# Patient Record
Sex: FEMALE | Race: WHITE | NOT HISPANIC OR LATINO
[De-identification: names, ages, dates, MRNs, and addresses within clinical notes are randomized per-mention and may not be internally consistent; named-entity substitution may affect disease eponyms.]

---

## 2017-03-09 ENCOUNTER — APPOINTMENT (OUTPATIENT)
Age: 67
End: 2017-03-09

## 2017-03-09 DIAGNOSIS — L97.509 TYPE 2 DIABETES MELLITUS WITH FOOT ULCER: ICD-10-CM

## 2017-03-09 DIAGNOSIS — E11.621 TYPE 2 DIABETES MELLITUS WITH FOOT ULCER: ICD-10-CM

## 2017-03-09 PROBLEM — Z00.00 ENCOUNTER FOR PREVENTIVE HEALTH EXAMINATION: Noted: 2017-03-09

## 2017-03-16 ENCOUNTER — APPOINTMENT (OUTPATIENT)
Age: 67
End: 2017-03-16

## 2017-03-16 RX ORDER — AMOXICILLIN 500 MG/1
500 TABLET, FILM COATED ORAL 3 TIMES DAILY
Refills: 0 | Status: ACTIVE | COMMUNITY
Start: 2017-03-09

## 2017-03-30 ENCOUNTER — APPOINTMENT (OUTPATIENT)
Age: 67
End: 2017-03-30

## 2017-04-20 ENCOUNTER — APPOINTMENT (OUTPATIENT)
Age: 67
End: 2017-04-20

## 2017-05-04 ENCOUNTER — APPOINTMENT (OUTPATIENT)
Age: 67
End: 2017-05-04

## 2017-06-01 ENCOUNTER — OUTPATIENT (OUTPATIENT)
Dept: OUTPATIENT SERVICES | Facility: HOSPITAL | Age: 67
LOS: 1 days | Discharge: HOME | End: 2017-06-01

## 2017-06-01 DIAGNOSIS — M86.9 OSTEOMYELITIS, UNSPECIFIED: ICD-10-CM

## 2017-06-28 DIAGNOSIS — L97.522 NON-PRESSURE CHRONIC ULCER OF OTHER PART OF LEFT FOOT WITH FAT LAYER EXPOSED: ICD-10-CM

## 2017-07-31 ENCOUNTER — OUTPATIENT (OUTPATIENT)
Dept: OUTPATIENT SERVICES | Facility: HOSPITAL | Age: 67
LOS: 1 days | Discharge: HOME | End: 2017-07-31

## 2017-07-31 DIAGNOSIS — M86.9 OSTEOMYELITIS, UNSPECIFIED: ICD-10-CM

## 2017-07-31 DIAGNOSIS — L97.511 NON-PRESSURE CHRONIC ULCER OF OTHER PART OF RIGHT FOOT LIMITED TO BREAKDOWN OF SKIN: ICD-10-CM

## 2017-11-13 ENCOUNTER — EMERGENCY (EMERGENCY)
Facility: HOSPITAL | Age: 67
LOS: 0 days | Discharge: HOME | End: 2017-11-13

## 2017-11-13 DIAGNOSIS — R10.32 LEFT LOWER QUADRANT PAIN: ICD-10-CM

## 2017-11-13 DIAGNOSIS — Z88.1 ALLERGY STATUS TO OTHER ANTIBIOTIC AGENTS STATUS: ICD-10-CM

## 2017-11-13 DIAGNOSIS — R11.0 NAUSEA: ICD-10-CM

## 2017-11-13 DIAGNOSIS — Z79.899 OTHER LONG TERM (CURRENT) DRUG THERAPY: ICD-10-CM

## 2017-11-13 DIAGNOSIS — R10.33 PERIUMBILICAL PAIN: ICD-10-CM

## 2017-11-13 DIAGNOSIS — E11.9 TYPE 2 DIABETES MELLITUS WITHOUT COMPLICATIONS: ICD-10-CM

## 2017-11-13 DIAGNOSIS — M54.9 DORSALGIA, UNSPECIFIED: ICD-10-CM

## 2017-11-13 DIAGNOSIS — R10.12 LEFT UPPER QUADRANT PAIN: ICD-10-CM

## 2017-11-13 DIAGNOSIS — M86.9 OSTEOMYELITIS, UNSPECIFIED: ICD-10-CM

## 2017-11-13 DIAGNOSIS — Z98.890 OTHER SPECIFIED POSTPROCEDURAL STATES: ICD-10-CM

## 2018-04-25 ENCOUNTER — TRANSCRIPTION ENCOUNTER (OUTPATIENT)
Age: 68
End: 2018-04-25

## 2018-05-21 ENCOUNTER — EMERGENCY (EMERGENCY)
Facility: HOSPITAL | Age: 68
LOS: 0 days | Discharge: HOME | End: 2018-05-21
Admitting: PHYSICIAN ASSISTANT

## 2018-05-21 VITALS
TEMPERATURE: 96 F | HEART RATE: 107 BPM | OXYGEN SATURATION: 99 % | RESPIRATION RATE: 17 BRPM | SYSTOLIC BLOOD PRESSURE: 151 MMHG | DIASTOLIC BLOOD PRESSURE: 113 MMHG
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
## 2018-05-21 DIAGNOSIS — Z88.1 ALLERGY STATUS TO OTHER ANTIBIOTIC AGENTS STATUS: ICD-10-CM

## 2018-05-21 DIAGNOSIS — F41.9 ANXIETY DISORDER, UNSPECIFIED: ICD-10-CM
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
## 2018-09-16 ENCOUNTER — INPATIENT (INPATIENT)
Facility: HOSPITAL | Age: 68
LOS: 0 days | Discharge: HOME | End: 2018-09-17
Attending: HOSPITALIST | Admitting: HOSPITALIST

## 2018-09-16 VITALS
DIASTOLIC BLOOD PRESSURE: 77 MMHG | HEIGHT: 67 IN | WEIGHT: 255.07 LBS | RESPIRATION RATE: 95 BRPM | OXYGEN SATURATION: 97 % | TEMPERATURE: 97 F | SYSTOLIC BLOOD PRESSURE: 169 MMHG

## 2018-09-16 DIAGNOSIS — Z98.890 OTHER SPECIFIED POSTPROCEDURAL STATES: Chronic | ICD-10-CM

## 2018-09-16 LAB
ANION GAP SERPL CALC-SCNC: 15 MMOL/L — HIGH (ref 7–14)
APTT BLD: 35.9 SEC — SIGNIFICANT CHANGE UP (ref 27–39.2)
BASOPHILS # BLD AUTO: 0.04 K/UL — SIGNIFICANT CHANGE UP (ref 0–0.2)
BASOPHILS NFR BLD AUTO: 0.3 % — SIGNIFICANT CHANGE UP (ref 0–1)
BUN SERPL-MCNC: 11 MG/DL — SIGNIFICANT CHANGE UP (ref 10–20)
CALCIUM SERPL-MCNC: 9.5 MG/DL — SIGNIFICANT CHANGE UP (ref 8.5–10.1)
CHLORIDE SERPL-SCNC: 100 MMOL/L — SIGNIFICANT CHANGE UP (ref 98–110)
CO2 SERPL-SCNC: 25 MMOL/L — SIGNIFICANT CHANGE UP (ref 17–32)
CREAT SERPL-MCNC: 0.8 MG/DL — SIGNIFICANT CHANGE UP (ref 0.7–1.5)
EOSINOPHIL # BLD AUTO: 0 K/UL — SIGNIFICANT CHANGE UP (ref 0–0.7)
EOSINOPHIL NFR BLD AUTO: 0 % — SIGNIFICANT CHANGE UP (ref 0–8)
ERYTHROCYTE [SEDIMENTATION RATE] IN BLOOD: 41 MM/HR — HIGH (ref 0–20)
GLUCOSE BLDC GLUCOMTR-MCNC: 147 MG/DL — HIGH (ref 70–99)
GLUCOSE BLDC GLUCOMTR-MCNC: 176 MG/DL — HIGH (ref 70–99)
GLUCOSE SERPL-MCNC: 134 MG/DL — HIGH (ref 70–99)
HCT VFR BLD CALC: 39.7 % — SIGNIFICANT CHANGE UP (ref 37–47)
HGB BLD-MCNC: 12.9 G/DL — SIGNIFICANT CHANGE UP (ref 12–16)
IMM GRANULOCYTES NFR BLD AUTO: 0.5 % — HIGH (ref 0.1–0.3)
INR BLD: 1.18 RATIO — SIGNIFICANT CHANGE UP (ref 0.65–1.3)
LYMPHOCYTES # BLD AUTO: 19 % — LOW (ref 20.5–51.1)
LYMPHOCYTES # BLD AUTO: 2.88 K/UL — SIGNIFICANT CHANGE UP (ref 1.2–3.4)
MCHC RBC-ENTMCNC: 27.2 PG — SIGNIFICANT CHANGE UP (ref 27–31)
MCHC RBC-ENTMCNC: 32.5 G/DL — SIGNIFICANT CHANGE UP (ref 32–37)
MCV RBC AUTO: 83.6 FL — SIGNIFICANT CHANGE UP (ref 81–99)
MONOCYTES # BLD AUTO: 1.12 K/UL — HIGH (ref 0.1–0.6)
MONOCYTES NFR BLD AUTO: 7.4 % — SIGNIFICANT CHANGE UP (ref 1.7–9.3)
NEUTROPHILS # BLD AUTO: 11.08 K/UL — HIGH (ref 1.4–6.5)
NEUTROPHILS NFR BLD AUTO: 72.8 % — SIGNIFICANT CHANGE UP (ref 42.2–75.2)
NRBC # BLD: 0 /100 WBCS — SIGNIFICANT CHANGE UP (ref 0–0)
PLATELET # BLD AUTO: 335 K/UL — SIGNIFICANT CHANGE UP (ref 130–400)
POTASSIUM SERPL-MCNC: 4.2 MMOL/L — SIGNIFICANT CHANGE UP (ref 3.5–5)
POTASSIUM SERPL-SCNC: 4.2 MMOL/L — SIGNIFICANT CHANGE UP (ref 3.5–5)
PROTHROM AB SERPL-ACNC: 12.7 SEC — SIGNIFICANT CHANGE UP (ref 9.95–12.87)
RBC # BLD: 4.75 M/UL — SIGNIFICANT CHANGE UP (ref 4.2–5.4)
RBC # FLD: 13.8 % — SIGNIFICANT CHANGE UP (ref 11.5–14.5)
SODIUM SERPL-SCNC: 140 MMOL/L — SIGNIFICANT CHANGE UP (ref 135–146)
WBC # BLD: 15.19 K/UL — HIGH (ref 4.8–10.8)
WBC # FLD AUTO: 15.19 K/UL — HIGH (ref 4.8–10.8)

## 2018-09-16 RX ORDER — ALPRAZOLAM 0.25 MG
1 TABLET ORAL DAILY
Refills: 0 | Status: DISCONTINUED | OUTPATIENT
Start: 2018-09-16 | End: 2018-09-17

## 2018-09-16 RX ORDER — IBUPROFEN 200 MG
200 TABLET ORAL EVERY 6 HOURS
Refills: 0 | Status: DISCONTINUED | OUTPATIENT
Start: 2018-09-16 | End: 2018-09-17

## 2018-09-16 RX ORDER — BUDESONIDE AND FORMOTEROL FUMARATE DIHYDRATE 160; 4.5 UG/1; UG/1
2 AEROSOL RESPIRATORY (INHALATION)
Refills: 0 | Status: DISCONTINUED | OUTPATIENT
Start: 2018-09-16 | End: 2018-09-17

## 2018-09-16 RX ORDER — PANTOPRAZOLE SODIUM 20 MG/1
40 TABLET, DELAYED RELEASE ORAL
Refills: 0 | Status: DISCONTINUED | OUTPATIENT
Start: 2018-09-16 | End: 2018-09-17

## 2018-09-16 RX ORDER — ENOXAPARIN SODIUM 100 MG/ML
40 INJECTION SUBCUTANEOUS EVERY 24 HOURS
Refills: 0 | Status: DISCONTINUED | OUTPATIENT
Start: 2018-09-16 | End: 2018-09-17

## 2018-09-16 RX ADMIN — Medication 200 MILLIGRAM(S): at 20:42

## 2018-09-16 RX ADMIN — Medication 100 MILLIGRAM(S): at 20:52

## 2018-09-16 RX ADMIN — Medication 100 MILLIGRAM(S): at 06:02

## 2018-09-16 RX ADMIN — Medication 100 MILLIGRAM(S): at 14:04

## 2018-09-16 RX ADMIN — Medication 200 MILLIGRAM(S): at 20:12

## 2018-09-16 NOTE — ED PROVIDER NOTE - PROGRESS NOTE DETAILS
Consulted podiatry, pending their evaluation.   Discussed with patient, Hospitalist and MAR, patient is admitted to medicine for IV abx and Podiatry consult.

## 2018-09-16 NOTE — ED ADULT TRIAGE NOTE - CHIEF COMPLAINT QUOTE
A few hours ago I noticed a blood blister on the bottom of my left foot, I'm borderline diabetic, not my whole foot is getting red and there is a line. I've been on Augmentin for two doses because I have a sore throat - patient

## 2018-09-16 NOTE — H&P ADULT - NSHPPHYSICALEXAM_GEN_ALL_CORE
T(C): 35.9 (09-16-18 @ 03:12), Max: 35.9 (09-16-18 @ 03:12)  HR: --  BP: 169/77 (09-16-18 @ 03:12) (169/77 - 169/77)  RR: 95 (09-16-18 @ 03:12) (95 - 95)  SpO2: 97% (09-16-18 @ 03:12) (97% - 97%)    PHYSICAL EXAM:  GENERAL: NAD, speaks in full sentences, no signs of respiratory distress  HEAD:  Atraumatic, Normocephalic  EYES: EOMI, PERRLA,   NECK: Supple,  CHEST/LUNG: Clear to auscultation bilaterally; No wheeze; No crackles; No accessory muscles used  HEART: Regular rate and rhythm; No murmurs;   ABDOMEN: Soft, Nontender, Nondistended; obese, scar in lower abdomen  EXTREMITIES:  2+ Peripheral Pulses, No cyanosis or edema, lt foot  hemorrhagic blister with surrounding erythema and warmth  PSYCH: AAOx3  NEUROLOGY: non-focal
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
## 2018-09-16 NOTE — ED ADULT NURSE NOTE - NSIMPLEMENTINTERV_GEN_ALL_ED
Implemented All Universal Safety Interventions:  [place] to call system. Call bell, personal items and telephone within reach. Instruct patient to call for assistance. Room bathroom lighting operational. Non-slip footwear when patient is off stretcher. Physically safe environment: no spills, clutter or unnecessary equipment. Stretcher in lowest position, wheels locked, appropriate side rails in place.

## 2018-09-16 NOTE — CONSULT NOTE ADULT - ATTENDING COMMENTS
68 year old female known to podiatry,evaluated bedside this AM,with resident. Left foot with superficial ulceration 1.5cmx1.0cm left foot first mpj area. There is no cellulitis S/P superficial I&D left foot. Agree with above ,continue local wound care and would D/C on oral antibiotic for 10 days. Follow up in office on D/C.

## 2018-09-16 NOTE — H&P ADULT - NSHPLABSRESULTS_GEN_ALL_CORE
12.9   15.19 )-----------( 335      ( 16 Sep 2018 04:23 )             39.7       09-16    140  |  100  |  11  ----------------------------<  134<H>  4.2   |  25  |  0.8    Ca    9.5      16 Sep 2018 04:23                    PT/INR - ( 16 Sep 2018 04:23 )   PT: 12.70 sec;   INR: 1.18 ratio         PTT - ( 16 Sep 2018 04:23 )  PTT:35.9 sec    Lactate Trend            CAPILLARY BLOOD GLUCOSE

## 2018-09-16 NOTE — H&P ADULT - PMH
Borderline diabetes    GERD (gastroesophageal reflux disease)    High cholesterol    HTN (hypertension)    Rheumatoid arthritis

## 2018-09-16 NOTE — ED PROVIDER NOTE - MEDICAL DECISION MAKING DETAILS
Patient presented to ED with foot pain/swelling/redness, labs noted, x-ray reviewed, consulted podiatry, IV abx given and is admitted to medicine for IV abx

## 2018-09-16 NOTE — H&P ADULT - ATTENDING COMMENTS
68 y f with pmh of HTN, RA , OA  PW Left Foot Cellulitis  1-Left Foot Cellulitis: IV clindamycin- ID eval-Foot X-Ray- Podiatry eval -drain and wound dressing  2-COPD: Stable, cw symbicort  3-GERD: cw PPI  4- DVT, GI Prophylaxis  Pager No. [phone] Patient evaluated independently, reviewed the chart and previous records, agree with the medical resident clinical findings and the plan of medical care, reviewed the medication reconciliation  68 y f with pmh of HTN, RA , OA  PW Left Foot Cellulitis  1-Left Foot Cellulitis: IV clindamycin- ID eval-Foot X-Ray- Podiatry eval -drain and wound dressing  2-COPD: Stable, cw symbicort  3-GERD: cw PPI  4- DVT, GI Prophylaxis  Pager No. [phone]

## 2018-09-16 NOTE — H&P ADULT - HISTORY OF PRESENT ILLNESS
68 y f with pmh of prediabetes, htn ,RA , OA, gerd who recently  had been to her podiatrist for callus area small blister on Lt foot, which was removed, she was recovering well. Patient was having sore throat, so completed course of prednisone, and still had symptoms, so her doctor gave Augmentin yesterday, she noticed small swelling on lt foot yesterday which got worse by night along with erythema which was streaking up but denies any fever ,chills, n/v, pain in foot, trauma. she also denies any other symptoms

## 2018-09-16 NOTE — ED ADULT NURSE NOTE - OBJECTIVE STATEMENT
The patient complains of a wound on left foot that appeared yesterday.  Patient states a red streak appeared going up left foot today.

## 2018-09-16 NOTE — ED PROVIDER NOTE - PHYSICAL EXAMINATION
Lt foot exam: on the plantar aspect of the MTP joint area has hemorrhagic blister with surrounding erythema, no crepitus, +swelling with erythema extending on to the lateral aspect of the foot to above the ankle area, no crepitus, full ROM of the joints noted, distally NVI.

## 2018-09-16 NOTE — H&P ADULT - ASSESSMENT
68 y f with pmh of prediabetes, htn ,RA , OA, gerd  p/w lt foot blister and cellulitis    1) lt foot hemorrhagic blister and cellulitis  start clindamycin iv and later change to po on discharge for total of 10 days  xray foot f/u  podiatry eval -drain and wound dressing    2) htn - monitor bp if persistently high consider starting medications as patient is not taking any meds at home    3) gerd -c/w protonix    4) OA- c/w advil prn    5) ?? copd /asthma- not using inhalers regularly- uses dulera infrequently at home, will start symbicort for now  no wheezing on examination    6)dvt ppx   diet- CC  dispo- can be discharged within 24 hrs

## 2018-09-17 ENCOUNTER — TRANSCRIPTION ENCOUNTER (OUTPATIENT)
Age: 68
End: 2018-09-17

## 2018-09-17 VITALS
SYSTOLIC BLOOD PRESSURE: 132 MMHG | DIASTOLIC BLOOD PRESSURE: 80 MMHG | HEART RATE: 91 BPM | TEMPERATURE: 98 F | RESPIRATION RATE: 20 BRPM

## 2018-09-17 LAB
ANION GAP SERPL CALC-SCNC: 18 MMOL/L — HIGH (ref 7–14)
BASOPHILS # BLD AUTO: 0.04 K/UL — SIGNIFICANT CHANGE UP (ref 0–0.2)
BASOPHILS NFR BLD AUTO: 0.4 % — SIGNIFICANT CHANGE UP (ref 0–1)
BUN SERPL-MCNC: 11 MG/DL — SIGNIFICANT CHANGE UP (ref 10–20)
CALCIUM SERPL-MCNC: 8.3 MG/DL — LOW (ref 8.5–10.1)
CHLORIDE SERPL-SCNC: 97 MMOL/L — LOW (ref 98–110)
CO2 SERPL-SCNC: 24 MMOL/L — SIGNIFICANT CHANGE UP (ref 17–32)
CREAT SERPL-MCNC: 0.8 MG/DL — SIGNIFICANT CHANGE UP (ref 0.7–1.5)
EOSINOPHIL # BLD AUTO: 0 K/UL — SIGNIFICANT CHANGE UP (ref 0–0.7)
EOSINOPHIL NFR BLD AUTO: 0 % — SIGNIFICANT CHANGE UP (ref 0–8)
GLUCOSE BLDC GLUCOMTR-MCNC: 124 MG/DL — HIGH (ref 70–99)
GLUCOSE BLDC GLUCOMTR-MCNC: 137 MG/DL — HIGH (ref 70–99)
GLUCOSE BLDC GLUCOMTR-MCNC: 151 MG/DL — HIGH (ref 70–99)
GLUCOSE SERPL-MCNC: 126 MG/DL — HIGH (ref 70–99)
HCT VFR BLD CALC: 37.9 % — SIGNIFICANT CHANGE UP (ref 37–47)
HGB BLD-MCNC: 12.2 G/DL — SIGNIFICANT CHANGE UP (ref 12–16)
IMM GRANULOCYTES NFR BLD AUTO: 0.3 % — SIGNIFICANT CHANGE UP (ref 0.1–0.3)
LYMPHOCYTES # BLD AUTO: 3.48 K/UL — HIGH (ref 1.2–3.4)
LYMPHOCYTES # BLD AUTO: 31.7 % — SIGNIFICANT CHANGE UP (ref 20.5–51.1)
MAGNESIUM SERPL-MCNC: 2.4 MG/DL — SIGNIFICANT CHANGE UP (ref 1.8–2.4)
MCHC RBC-ENTMCNC: 27.4 PG — SIGNIFICANT CHANGE UP (ref 27–31)
MCHC RBC-ENTMCNC: 32.2 G/DL — SIGNIFICANT CHANGE UP (ref 32–37)
MCV RBC AUTO: 85.2 FL — SIGNIFICANT CHANGE UP (ref 81–99)
MONOCYTES # BLD AUTO: 0.9 K/UL — HIGH (ref 0.1–0.6)
MONOCYTES NFR BLD AUTO: 8.2 % — SIGNIFICANT CHANGE UP (ref 1.7–9.3)
NEUTROPHILS # BLD AUTO: 6.52 K/UL — HIGH (ref 1.4–6.5)
NEUTROPHILS NFR BLD AUTO: 59.4 % — SIGNIFICANT CHANGE UP (ref 42.2–75.2)
PLATELET # BLD AUTO: 287 K/UL — SIGNIFICANT CHANGE UP (ref 130–400)
POTASSIUM SERPL-MCNC: 4.8 MMOL/L — SIGNIFICANT CHANGE UP (ref 3.5–5)
POTASSIUM SERPL-SCNC: 4.8 MMOL/L — SIGNIFICANT CHANGE UP (ref 3.5–5)
RBC # BLD: 4.45 M/UL — SIGNIFICANT CHANGE UP (ref 4.2–5.4)
RBC # FLD: 13.9 % — SIGNIFICANT CHANGE UP (ref 11.5–14.5)
SODIUM SERPL-SCNC: 139 MMOL/L — SIGNIFICANT CHANGE UP (ref 135–146)
WBC # BLD: 10.97 K/UL — HIGH (ref 4.8–10.8)
WBC # FLD AUTO: 10.97 K/UL — HIGH (ref 4.8–10.8)

## 2018-09-17 RX ORDER — FLUCONAZOLE 150 MG/1
1 TABLET ORAL
Qty: 7 | Refills: 0
Start: 2018-09-17 | End: 2018-09-23

## 2018-09-17 RX ADMIN — PANTOPRAZOLE SODIUM 40 MILLIGRAM(S): 20 TABLET, DELAYED RELEASE ORAL at 05:36

## 2018-09-17 RX ADMIN — Medication 100 MILLIGRAM(S): at 05:36

## 2018-09-17 RX ADMIN — Medication 100 MILLIGRAM(S): at 14:09

## 2018-09-17 NOTE — DISCHARGE NOTE ADULT - PLAN OF CARE
Continue taking antibiotics and have appropriate follow up Please continue to take oral antibiotics as prescribed Please continue to take oral antibiotics as prescribed and follow up with the podiatrist as outpatient Please continue to take oral antibiotics as prescribed and follow up with your podiatrist as outpatient within 2 weeks and also follow up with your primary care doctor within 2 weeks.

## 2018-09-17 NOTE — DISCHARGE NOTE ADULT - PATIENT PORTAL LINK FT
You can access the [facility] Patient Portal, offered by [facility], by registering with the following website: [email or web address]

## 2018-09-17 NOTE — PROGRESS NOTE ADULT - SUBJECTIVE AND OBJECTIVE BOX
HEBERT URIBE MRN-[id number]    Hospitalist Note  69yo F with Past Medical History DM,  HTN, Rheumatoid Arthritis, Osteoarthritis, and GERD admitted for a worsening LLE ulcer.  Status post superficial I&D.    Overnight events/Updates:  No new complaints.      Vital Signs Last 24 Hrs  T(C): 35.8 (17 Sep 2018 05:28), Max: 36.6 (16 Sep 2018 15:43)  T(F): 96.5 (17 Sep 2018 05:28), Max: 97.9 (16 Sep 2018 15:43)  HR: 73 (17 Sep 2018 05:28) (73 - 100)  BP: 106/62 (17 Sep 2018 05:28) (106/62 - 168/76)  BP(mean): --  RR: 18 (17 Sep 2018 05:28) (18 - 18)  SpO2: 97% (16 Sep 2018 22:58) (97% - 97%)    Physical Examination:  General: AAO x   HEENT: PERRLA, EOMI  CV= S1 & S2 appreciated  Lungs=  Abdominal Examination= + BS  Extremity Examination=     ROS: No chest pain, no shortness of breath.  All other systems reviewed and are within normal limits except for the complaints in the HPI.    MEDICATIONS  (STANDING):  buDESOnide  80 MICROgram(s)/formoterol 4.5 MICROgram(s) Inhaler 2 Puff(s) Inhalation two times a day  clindamycin IVPB      clindamycin IVPB 600 milliGRAM(s) IV Intermittent every 8 hours  enoxaparin Injectable 40 milliGRAM(s) SubCutaneous every 24 hours  pantoprazole    Tablet 40 milliGRAM(s) Oral before breakfast    MEDICATIONS  (PRN):  ALPRAZolam 1 milliGRAM(s) Oral daily PRN anxiety  ibuprofen  Tablet. 200 milliGRAM(s) Oral every 6 hours PRN Moderate Pain (4 - 6)                            12.2   10.97 )-----------( 287      ( 17 Sep 2018 05:59 )             37.9     09-17    139  |  97<L>  |  11  ----------------------------<  126<H>  4.8   |  24  |  0.8    Ca    8.3<L>      17 Sep 2018 05:59  Mg     2.4     09-17        Case discussed with housestaff & family  TREVON Noriega [id number]

## 2018-09-17 NOTE — DISCHARGE NOTE ADULT - HOSPITAL COURSE
68 y f with PMH of prediabetes, HTN, RA , OA, GERD  presented with one day history of small swelling on Lt foot which worsened overnight accompanied with erythema which was streaking up her leg. On presentation she was afebrile, hypertensive and had elevated WBC (15). She was started on IV clindamycin. Podiatry saw patient and performed bedside I&D of superficial abscess. ID will be consulted. 68 y f with PMH of prediabetes, HTN, RA , OA, GERD  presented with one day history of small swelling on Lt foot which worsened overnight accompanied with erythema which was streaking up her leg. On presentation she was afebrile, hypertensive and had elevated WBC (15). She was started on IV clindamycin. Podiatry saw patient and assessed there to be a superficial abscess in the Left foot first MPJ area with no cellulitis and performed bedside I&D. Patient will have to continue wound care at home and IV antibiotics will be switched over to PO antibiotics and patient will continue PO antibiotics outpatient for 10 days. Will also discharge on fluconazole for 1 week as prophylaxis for yeast infection while patient is on antibiotics. 68 y f with PMH of prediabetes, HTN, RA , OA, GERD  presented with one day history of small swelling on Lt foot which worsened overnight accompanied with erythema which was streaking up her leg. On presentation she was afebrile, hypertensive and had elevated WBC (15). She was started on IV clindamycin. Podiatry saw patient and assessed there to be a superficial abscess in the Left foot first MPJ area with no cellulitis and performed bedside I&D. Patient will have to continue wound care at home and IV antibiotics will be switched over to PO antibiotics and patient will continue PO antibiotics outpatient for 10 days. Will also discharge on fluconazole for 1 week as prophylaxis for yeast infection while patient is on antibiotics.     < from: Xray Foot AP + Lateral + Oblique, Left (09.16.18 @ 06:05) >  Impression:    No evidence of a fracture.    Mild hallux valgus with a mild size medial bunion. 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    Large plantar calcaneal spur and Achilles tendon spur.    < end of copied text >

## 2018-09-17 NOTE — PROGRESS NOTE ADULT - ASSESSMENT
67yo F with Past Medical History DM,  HTN, Rheumatoid Arthritis, Osteoarthritis, and GERD admitted for a worsening LLE ulcer.  Status post superficial I&D.    LLE Ulcer/Blister with superimposed cellulitis: status post I&D.  Continue Clindamycin 300mg PO q8 x10d upon discharge.  Radiographs were negative for fracture or osteomylelitis.  Continue Betadine/Adaptic/DSD/Kerlix and follow-up with Dr. Valerio as outpatient.  Add Fluconazole 100mg PO q24 x1 week while receiving abx due to prior yeast infections.  Hypertension: blood pressure well controlled; continue low salt diet  GERD: continue PO Protonix  History of Asthma: no active wheezing.  Discharge home off Symbicort.  Follow-up with PMD as outpatient to assist with further management  DVT prophylaxis

## 2018-09-17 NOTE — DISCHARGE NOTE ADULT - MEDICATION SUMMARY - MEDICATIONS TO TAKE
I will START or STAY ON the medications listed below when I get home from the hospital:    Advil 200 mg oral tablet  -- 1 tab(s) by mouth every 6 hours, As Needed  -- Indication: For Pain    Diflucan 100 mg oral tablet  -- 1 tab(s) by mouth once a day   -- Do not take this drug if you are pregnant.  Finish all this medication unless otherwise directed by prescriber.    -- Indication: For Prevent fungal infection    Xanax 1 mg oral tablet  -- 1 tab(s) by mouth once a day, As Needed  -- Indication: For Anxiety    Dulera 100 mcg-5 mcg/inh inhalation aerosol  -- 2 puff(s) inhaled 2 times a day  -- Indication: For Shortness of breath    Zantac 150 oral tablet  -- 1 tab(s) by mouth once a day  -- Indication: For GERD (gastroesophageal reflux disease)    Cleocin HCl 300 mg oral capsule  -- 1 cap(s) by mouth 3 times a day   -- Finish all this medication unless otherwise directed by prescriber.  Medication should be taken with plenty of water.    -- Indication: For Foot infection

## 2018-09-17 NOTE — DISCHARGE NOTE ADULT - CARE PLAN
Principal Discharge DX:	Foot infection  Goal:	Continue taking antibiotics and have appropriate follow up  Assessment and plan of treatment:	Please continue to take oral antibiotics as prescribed Principal Discharge DX:	Foot infection  Goal:	Continue taking antibiotics and have appropriate follow up  Assessment and plan of treatment:	Please continue to take oral antibiotics as prescribed and follow up with the podiatrist as outpatient Principal Discharge DX:	Foot infection  Goal:	Continue taking antibiotics and have appropriate follow up  Assessment and plan of treatment:	Please continue to take oral antibiotics as prescribed and follow up with your podiatrist as outpatient within 2 weeks and also follow up with your primary care doctor within 2 weeks.

## 2018-09-17 NOTE — DISCHARGE NOTE ADULT - MEDICATION SUMMARY - MEDICATIONS TO STOP TAKING
I will STOP taking the medications listed below when I get home from the hospital:    Augmentin 200 mg-28.5 mg oral tablet, chewable  -- 1 tab(s) by mouth every 12 hours

## 2018-09-18 LAB
CULTURE RESULTS: SIGNIFICANT CHANGE UP
GLUCOSE BLDC GLUCOMTR-MCNC: 154 MG/DL — HIGH (ref 70–99)
SPECIMEN SOURCE: SIGNIFICANT CHANGE UP

## 2018-09-19 DIAGNOSIS — M06.9 RHEUMATOID ARTHRITIS, UNSPECIFIED: ICD-10-CM

## 2018-09-19 DIAGNOSIS — I10 ESSENTIAL (PRIMARY) HYPERTENSION: ICD-10-CM

## 2018-09-19 DIAGNOSIS — K21.9 GASTRO-ESOPHAGEAL REFLUX DISEASE WITHOUT ESOPHAGITIS: ICD-10-CM

## 2018-09-19 DIAGNOSIS — L84 CORNS AND CALLOSITIES: ICD-10-CM

## 2018-09-19 DIAGNOSIS — R73.03 PREDIABETES: ICD-10-CM

## 2018-09-19 DIAGNOSIS — J44.9 CHRONIC OBSTRUCTIVE PULMONARY DISEASE, UNSPECIFIED: ICD-10-CM

## 2018-09-19 DIAGNOSIS — L97.529 NON-PRESSURE CHRONIC ULCER OF OTHER PART OF LEFT FOOT WITH UNSPECIFIED SEVERITY: ICD-10-CM

## 2018-09-19 DIAGNOSIS — Z98.890 OTHER SPECIFIED POSTPROCEDURAL STATES: ICD-10-CM

## 2018-09-19 DIAGNOSIS — L02.612 CUTANEOUS ABSCESS OF LEFT FOOT: ICD-10-CM

## 2018-09-19 DIAGNOSIS — M19.90 UNSPECIFIED OSTEOARTHRITIS, UNSPECIFIED SITE: ICD-10-CM

## 2018-09-19 DIAGNOSIS — E78.00 PURE HYPERCHOLESTEROLEMIA, UNSPECIFIED: ICD-10-CM

## 2018-09-19 DIAGNOSIS — L03.116 CELLULITIS OF LEFT LOWER LIMB: ICD-10-CM

## 2018-09-21 LAB
CULTURE RESULTS: SIGNIFICANT CHANGE UP
SPECIMEN SOURCE: SIGNIFICANT CHANGE UP

## 2021-02-05 PROBLEM — Z00.00 ENCOUNTER FOR PREVENTIVE HEALTH EXAMINATION: Status: ACTIVE | Noted: 2021-02-05

## 2022-01-07 NOTE — CONSULT NOTE ADULT - SUBJECTIVE AND OBJECTIVE BOX
PODIATRY CONSULT   NILESJESSICAHEBERT is a 68y old  Female who presents with a chief complaint of left foot erythema (16 Sep 2018 09:31)    HPI:  68 y f with pmh of prediabetes, htn ,RA , OA, gerd who recently  had been to her podiatrist for callus area small blister on Lt foot, which was removed, she was recovering well. Patient was having sore throat, so completed course of prednisone, and still had symptoms, so her doctor gave Augmentin yesterday, she noticed small swelling on lt foot yesterday which got worse by night along with erythema which was streaking up but denies any fever ,chills, n/v, pain in foot, trauma. she also denies any other symptoms (16 Sep 2018 09:31)    FOOT INFECTION;CELLULITIS  GERD (gastroesophageal reflux disease)  HTN (hypertension)  Borderline diabetes  High cholesterol  Rheumatoid arthritis  No pertinent past medical history      PMH: FOOT INFECTION;CELLULITIS  GERD (gastroesophageal reflux disease)  HTN (hypertension)  Borderline diabetes  High cholesterol  Rheumatoid arthritis  No pertinent past medical history    PSH: H/O abdominoplasty  Status post breast reduction  No significant past surgical history    Medication clindamycin IVPB      clindamycin IVPB 600 milliGRAM(s) IV Intermittent once  clindamycin IVPB 600 milliGRAM(s) IV Intermittent every 8 hours    Allergy: Biaxin (Other)  ciprofloxacin (Other)  Omnicef (Other)  tetracyclines (Unknown)        Labs:                        12.9   15.19 )-----------( 335      ( 16 Sep 2018 04:23 )             39.7     PT/INR - ( 16 Sep 2018 04:23 )   PT: 12.70 sec;   INR: 1.18 ratio         PTT - ( 16 Sep 2018 04:23 )  PTT:35.9 sec  09-16    140  |  100  |  11  ----------------------------<  134<H>  4.2   |  25  |  0.8    Ca    9.5      16 Sep 2018 04:23              O:   Derm: Superficial abscess left medial 1st MTPJ next to a callus lesions just plantar of the abscess with small opening centrally and tracking to the abscess lesion. + ricardo-abcess erythema, + 2cc purulence, - fluctuance, + tracking/tunneling, - probe to bone. + streaking erythema - Improved on clinda .  + active sign of infection. no malodor  Vascular: Dorsalis Pedis and Posterior Tibial pulses 2/4 b/l.  Capillary re-fill time less then 3 seconds digits 1-5 bilateral.  B/L feet warm to touch  Neuro: Protective sensation intact to the level of the digits bilateral.  MSK: Muscle strength 5/5 all major muscle groups bilateral.        Assessment and Plan:   Pt with Superficial abscess left 1st MTPJ  P:  Chart reviewed and Patient evaluated  Discussed diagnosis and treatment with patient  I&D of Abscess at bedside  Removal of all overlying skin down to healthy subcutaneous tissue  Wound flush with normal saline  Applied --- with dry sterile dressing  Offloading to bilateral Heels.   Obtained wound culture to be sent to Pathology  X-rays ordered/reviewed : Shows -------  Recommend ID/Vascular consult  Continue IV abx as per ID  Arterial duplex ordered  Weight bearing/Non-weight bearing  to ------------  Discussed importance of daily foot examinations and proper shoe gear and to importance of lower Fasting Blood Glucose levels.   Podiatry will follow while in house.   will discuss care plan  with all  Attendings ------  Pt to OR for Excisional debridement  on non viable soft  tissue  ---- base down to subcutaneous tissue red granular base  Right/ Left foot ulceration  Medical clearance requested PODIATRY CONSULT   NILESJESSICAHEBERT is a 68y old  Female who presents with a chief complaint of left foot erythema (16 Sep 2018 09:31)    HPI:  68 y f with pmh of prediabetes, htn ,RA , OA, gerd who recently  had been to her podiatrist for callus area small blister on Lt foot, which was removed, she was recovering well. Patient was having sore throat, so completed course of prednisone, and still had symptoms, so her doctor gave Augmentin yesterday, she noticed small swelling on lt foot yesterday which got worse by night along with erythema which was streaking up but denies any fever ,chills, n/v, pain in foot, trauma. she also denies any other symptoms (16 Sep 2018 09:31)    FOOT INFECTION;CELLULITIS  GERD (gastroesophageal reflux disease)  HTN (hypertension)  Borderline diabetes  High cholesterol  Rheumatoid arthritis  No pertinent past medical history      PMH: FOOT INFECTION;CELLULITIS  GERD (gastroesophageal reflux disease)  HTN (hypertension)  Borderline diabetes  High cholesterol  Rheumatoid arthritis  No pertinent past medical history    PSH: H/O abdominoplasty  Status post breast reduction  No significant past surgical history    Medication clindamycin IVPB      clindamycin IVPB 600 milliGRAM(s) IV Intermittent once  clindamycin IVPB 600 milliGRAM(s) IV Intermittent every 8 hours    Allergy: Biaxin (Other)  ciprofloxacin (Other)  Omnicef (Other)  tetracyclines (Unknown)        Labs:                        12.9   15.19 )-----------( 335      ( 16 Sep 2018 04:23 )             39.7     PT/INR - ( 16 Sep 2018 04:23 )   PT: 12.70 sec;   INR: 1.18 ratio         PTT - ( 16 Sep 2018 04:23 )  PTT:35.9 sec  09-16    140  |  100  |  11  ----------------------------<  134<H>  4.2   |  25  |  0.8    Ca    9.5      16 Sep 2018 04:23              O:   Derm: Superficial abscess left medial 1st MTPJ next to a callus lesions just plantar of the abscess with small opening centrally and tracking to the abscess lesion. + ricardo-abcess erythema, + 2cc purulence, - fluctuance, + tracking/tunneling, - probe to bone. + streaking erythema - Improved on clinda .  + active sign of infection. no malodor  Vascular: Dorsalis Pedis and Posterior Tibial pulses 2/4 b/l.  Capillary re-fill time less then 3 seconds digits 1-5 bilateral.  B/L feet warm to touch  Neuro: Protective sensation intact to the level of the digits bilateral.  MSK: Muscle strength 5/5 all major muscle groups bilateral.    < from: Xray Foot AP + Lateral + Oblique, Left (09.16.18 @ 06:05) >  XAM:  XR FOOT COMP MIN 3 VIEWS LT        PROCEDURE DATE:  09/16/2018    INTERPRETATION:  Clinical History / Reason for exam: Pain.  Portable AP, lateral and oblique views of the right foot were performed   and submitted for evaluation. Comparison is made to the prior study dated   March 29, 2017.  No fractures or dislocations are seen. There is a mild hallux valgus with   a mild size medial bunion. Mild to moderate degenerative disease is noted   of the first MTP joint. There is a large plantar calcaneal spur and   Achilles tendon spur. The bone mineral density is normal.    Impression:  No evidence of a fracture.  Mild hallux valgus with a mild size medial bunion. 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  Large plantar calcaneal spur and Achilles tendon spur.  CAYLA SEWELL M.D., ATTENDING RADIOLOGIST  This document has been electronically signed. Sep 16 2018  9:09AM    Assessment and Plan:   Pt with Superficial abscess left 1st MTPJ  P:  Chart reviewed and Patient evaluated  Discussed diagnosis and treatment with patient    I&D of Superficial Abscess at bedside  Removal of all overlying skin down to healthy subcutaneous tissue  Wound base granular and without signs of deep infection  Wound flush with normal saline  Applied Betadine/Adaptic/DSD/Kerlix    Obtained wound culture to be sent to Pathology  X-rays reviewed with pt  Recommend ID consult  Continue IV abx as per ID  Podiatry will follow while in house.   will discuss care plan  with  Attending Improved.

## 2022-01-31 RX ORDER — RANITIDINE HYDROCHLORIDE 150 MG/1
1 TABLET, FILM COATED ORAL
Qty: 0 | Refills: 0 | DISCHARGE

## 2022-01-31 RX ORDER — MOMETASONE FUROATE AND FORMOTEROL FUMARATE DIHYDRATE 200; 5 UG/1; UG/1
2 AEROSOL RESPIRATORY (INHALATION)
Qty: 0 | Refills: 0 | DISCHARGE

## 2022-01-31 RX ORDER — ALPRAZOLAM 0.25 MG
1 TABLET ORAL
Qty: 0 | Refills: 0 | DISCHARGE

## 2022-01-31 RX ORDER — IBUPROFEN 200 MG
1 TABLET ORAL
Qty: 0 | Refills: 0 | DISCHARGE

## 2022-02-12 ENCOUNTER — EMERGENCY (EMERGENCY)
Facility: HOSPITAL | Age: 72
LOS: 0 days | Discharge: HOME | End: 2022-02-13
Attending: EMERGENCY MEDICINE | Admitting: EMERGENCY MEDICINE
Payer: MEDICARE

## 2022-02-12 VITALS
WEIGHT: 257.94 LBS | TEMPERATURE: 98 F | SYSTOLIC BLOOD PRESSURE: 172 MMHG | HEART RATE: 92 BPM | HEIGHT: 67 IN | RESPIRATION RATE: 18 BRPM | OXYGEN SATURATION: 95 % | DIASTOLIC BLOOD PRESSURE: 81 MMHG

## 2022-02-12 DIAGNOSIS — R10.9 UNSPECIFIED ABDOMINAL PAIN: ICD-10-CM

## 2022-02-12 DIAGNOSIS — Z98.890 OTHER SPECIFIED POSTPROCEDURAL STATES: Chronic | ICD-10-CM

## 2022-02-12 DIAGNOSIS — Z20.822 CONTACT WITH AND (SUSPECTED) EXPOSURE TO COVID-19: ICD-10-CM

## 2022-02-12 DIAGNOSIS — E78.5 HYPERLIPIDEMIA, UNSPECIFIED: ICD-10-CM

## 2022-02-12 DIAGNOSIS — I10 ESSENTIAL (PRIMARY) HYPERTENSION: ICD-10-CM

## 2022-02-12 DIAGNOSIS — E11.9 TYPE 2 DIABETES MELLITUS WITHOUT COMPLICATIONS: ICD-10-CM

## 2022-02-12 LAB
ALBUMIN SERPL ELPH-MCNC: 4 G/DL — SIGNIFICANT CHANGE UP (ref 3.5–5.2)
ALP SERPL-CCNC: 79 U/L — SIGNIFICANT CHANGE UP (ref 30–115)
ALT FLD-CCNC: 19 U/L — SIGNIFICANT CHANGE UP (ref 0–41)
ANION GAP SERPL CALC-SCNC: 13 MMOL/L — SIGNIFICANT CHANGE UP (ref 7–14)
APPEARANCE UR: CLEAR — SIGNIFICANT CHANGE UP
AST SERPL-CCNC: 42 U/L — HIGH (ref 0–41)
BASE EXCESS BLDV CALC-SCNC: 0.7 MMOL/L — SIGNIFICANT CHANGE UP (ref -2–3)
BASOPHILS # BLD AUTO: 0.03 K/UL — SIGNIFICANT CHANGE UP (ref 0–0.2)
BASOPHILS NFR BLD AUTO: 0.2 % — SIGNIFICANT CHANGE UP (ref 0–1)
BILIRUB SERPL-MCNC: 0.4 MG/DL — SIGNIFICANT CHANGE UP (ref 0.2–1.2)
BILIRUB UR-MCNC: NEGATIVE — SIGNIFICANT CHANGE UP
BLD GP AB SCN SERPL QL: SIGNIFICANT CHANGE UP
BUN SERPL-MCNC: 13 MG/DL — SIGNIFICANT CHANGE UP (ref 10–20)
CA-I SERPL-SCNC: 1.2 MMOL/L — SIGNIFICANT CHANGE UP (ref 1.15–1.33)
CALCIUM SERPL-MCNC: 8.9 MG/DL — SIGNIFICANT CHANGE UP (ref 8.5–10.1)
CHLORIDE SERPL-SCNC: 102 MMOL/L — SIGNIFICANT CHANGE UP (ref 98–110)
CO2 SERPL-SCNC: 21 MMOL/L — SIGNIFICANT CHANGE UP (ref 17–32)
COLOR SPEC: SIGNIFICANT CHANGE UP
CREAT SERPL-MCNC: 0.8 MG/DL — SIGNIFICANT CHANGE UP (ref 0.7–1.5)
DIFF PNL FLD: NEGATIVE — SIGNIFICANT CHANGE UP
EOSINOPHIL # BLD AUTO: 0 K/UL — SIGNIFICANT CHANGE UP (ref 0–0.7)
EOSINOPHIL NFR BLD AUTO: 0 % — SIGNIFICANT CHANGE UP (ref 0–8)
GAS PNL BLDV: 138 MMOL/L — SIGNIFICANT CHANGE UP (ref 136–145)
GAS PNL BLDV: SIGNIFICANT CHANGE UP
GLUCOSE SERPL-MCNC: 172 MG/DL — HIGH (ref 70–99)
GLUCOSE UR QL: NEGATIVE — SIGNIFICANT CHANGE UP
HCO3 BLDV-SCNC: 27 MMOL/L — SIGNIFICANT CHANGE UP (ref 22–29)
HCT VFR BLD CALC: 38.1 % — SIGNIFICANT CHANGE UP (ref 37–47)
HCT VFR BLDA CALC: 38 % — LOW (ref 39–51)
HGB BLD CALC-MCNC: 12.5 G/DL — LOW (ref 12.6–17.4)
HGB BLD-MCNC: 12.4 G/DL — SIGNIFICANT CHANGE UP (ref 12–16)
IMM GRANULOCYTES NFR BLD AUTO: 0.4 % — HIGH (ref 0.1–0.3)
KETONES UR-MCNC: NEGATIVE — SIGNIFICANT CHANGE UP
LACTATE BLDV-MCNC: 2.7 MMOL/L — HIGH (ref 0.5–2)
LACTATE SERPL-SCNC: 2.2 MMOL/L — HIGH (ref 0.7–2)
LEUKOCYTE ESTERASE UR-ACNC: NEGATIVE — SIGNIFICANT CHANGE UP
LIDOCAIN IGE QN: 32 U/L — SIGNIFICANT CHANGE UP (ref 7–60)
LYMPHOCYTES # BLD AUTO: 2.74 K/UL — SIGNIFICANT CHANGE UP (ref 1.2–3.4)
LYMPHOCYTES # BLD AUTO: 20.3 % — LOW (ref 20.5–51.1)
MCHC RBC-ENTMCNC: 28.5 PG — SIGNIFICANT CHANGE UP (ref 27–31)
MCHC RBC-ENTMCNC: 32.5 G/DL — SIGNIFICANT CHANGE UP (ref 32–37)
MCV RBC AUTO: 87.6 FL — SIGNIFICANT CHANGE UP (ref 81–99)
MONOCYTES # BLD AUTO: 0.91 K/UL — HIGH (ref 0.1–0.6)
MONOCYTES NFR BLD AUTO: 6.8 % — SIGNIFICANT CHANGE UP (ref 1.7–9.3)
NEUTROPHILS # BLD AUTO: 9.74 K/UL — HIGH (ref 1.4–6.5)
NEUTROPHILS NFR BLD AUTO: 72.3 % — SIGNIFICANT CHANGE UP (ref 42.2–75.2)
NITRITE UR-MCNC: NEGATIVE — SIGNIFICANT CHANGE UP
NRBC # BLD: 0 /100 WBCS — SIGNIFICANT CHANGE UP (ref 0–0)
PCO2 BLDV: 52 MMHG — HIGH (ref 39–42)
PH BLDV: 7.33 — SIGNIFICANT CHANGE UP (ref 7.32–7.43)
PH UR: 6 — SIGNIFICANT CHANGE UP (ref 5–8)
PLATELET # BLD AUTO: 305 K/UL — SIGNIFICANT CHANGE UP (ref 130–400)
PO2 BLDV: 37 MMHG — SIGNIFICANT CHANGE UP
POTASSIUM BLDV-SCNC: 5 MMOL/L — SIGNIFICANT CHANGE UP (ref 3.5–5.1)
POTASSIUM SERPL-MCNC: 5.7 MMOL/L — HIGH (ref 3.5–5)
POTASSIUM SERPL-SCNC: 5.7 MMOL/L — HIGH (ref 3.5–5)
PROT SERPL-MCNC: 7.3 G/DL — SIGNIFICANT CHANGE UP (ref 6–8)
PROT UR-MCNC: NEGATIVE — SIGNIFICANT CHANGE UP
RBC # BLD: 4.35 M/UL — SIGNIFICANT CHANGE UP (ref 4.2–5.4)
RBC # FLD: 13.8 % — SIGNIFICANT CHANGE UP (ref 11.5–14.5)
SAO2 % BLDV: 58 % — SIGNIFICANT CHANGE UP
SARS-COV-2 RNA SPEC QL NAA+PROBE: SIGNIFICANT CHANGE UP
SODIUM SERPL-SCNC: 136 MMOL/L — SIGNIFICANT CHANGE UP (ref 135–146)
SP GR SPEC: 1.01 — LOW (ref 1.01–1.03)
UROBILINOGEN FLD QL: SIGNIFICANT CHANGE UP
WBC # BLD: 13.47 K/UL — HIGH (ref 4.8–10.8)
WBC # FLD AUTO: 13.47 K/UL — HIGH (ref 4.8–10.8)

## 2022-02-12 PROCEDURE — 71045 X-RAY EXAM CHEST 1 VIEW: CPT | Mod: 26

## 2022-02-12 PROCEDURE — 99285 EMERGENCY DEPT VISIT HI MDM: CPT

## 2022-02-12 PROCEDURE — 74177 CT ABD & PELVIS W/CONTRAST: CPT | Mod: 26,MA

## 2022-02-12 PROCEDURE — 93010 ELECTROCARDIOGRAM REPORT: CPT

## 2022-02-12 RX ORDER — ACETAMINOPHEN 500 MG
650 TABLET ORAL ONCE
Refills: 0 | Status: COMPLETED | OUTPATIENT
Start: 2022-02-12 | End: 2022-02-12

## 2022-02-12 RX ORDER — SODIUM CHLORIDE 9 MG/ML
1000 INJECTION, SOLUTION INTRAVENOUS ONCE
Refills: 0 | Status: COMPLETED | OUTPATIENT
Start: 2022-02-12 | End: 2022-02-12

## 2022-02-12 RX ORDER — IOHEXOL 300 MG/ML
30 INJECTION, SOLUTION INTRAVENOUS ONCE
Refills: 0 | Status: COMPLETED | OUTPATIENT
Start: 2022-02-12 | End: 2022-02-12

## 2022-02-12 RX ADMIN — SODIUM CHLORIDE 1000 MILLILITER(S): 9 INJECTION, SOLUTION INTRAVENOUS at 21:00

## 2022-02-12 RX ADMIN — Medication 650 MILLIGRAM(S): at 21:58

## 2022-02-12 RX ADMIN — IOHEXOL 30 MILLILITER(S): 300 INJECTION, SOLUTION INTRAVENOUS at 18:40

## 2022-02-12 RX ADMIN — Medication 650 MILLIGRAM(S): at 19:50

## 2022-02-12 NOTE — ED ADULT TRIAGE NOTE - CHIEF COMPLAINT QUOTE
PT reports abdominal pain radiating from LUQ to LLQ to lower back. PT also has rash to belly button, PMD states that it is infected. PT having nausea and vomiting. PT also has hernia. Rebound pain to LLQ abdomen.

## 2022-02-12 NOTE — ED PROVIDER NOTE - CARE PROVIDER_API CALL
Erendira Owens)  Surgery  [address] [place] [address]  Phone: [phone]  Fax: [phone]  Follow Up Time: Routine    Chip Mcpherson)  Surgery  [address] [facility] [facility]  [place] [address]  Phone: [phone]  Fax: [phone]  Follow Up Time: Routine

## 2022-02-12 NOTE — ED PROVIDER NOTE - PROVIDER TOKENS
PROVIDER:[TOKEN:[[id number]],FOLLOWUP:[Routine]],PROVIDER:[TOKEN:[[id number]],FOLLOWUP:[Routine]]

## 2022-02-12 NOTE — ED PROVIDER NOTE - PROGRESS NOTE DETAILS
ER: pt signed out to Dr. Lara MM: received in sign out pending CT read. Patient resting comfortably in bed. MM: Consulted surgery, will evaluate.

## 2022-02-12 NOTE — ED PROVIDER NOTE - ATTENDING CONTRIBUTION TO CARE
71-year-old female with a past medical history significant hernia, hypertension, hyperlipidemia, diabetes, status post attempted presents with abdominal pain.  Patient states that she has been having abdominal pain for approximately couple however today it has been.  Patient had an episode of vomiting, and noticed.  Patient's last bowel movement was approximately last night.  Patient denies any urinary symptoms diarrhea, or any other complaints.    VITAL SIGNS: I have reviewed nursing notes and confirm.  CONSTITUTIONAL: non-toxic, well appearing  SKIN: no rash, no petechiae.  EYES: EOMI, pink conjunctiva, anicteric  ENT: tongue midline, no exudates, MMM  NECK: Supple; no meningismus, no JVD  CARD: RRR, no murmurs, equal radial pulses bilaterally 2+  RESP: CTAB, no respiratory distress  ABD: Soft, non-tender, non-distended, umbilical hernia with overlying erythema,   EXT: Normal ROM x4.   NEURO: Alert, oriented x3.       a/p  71 yr old f that presents with abd pain   -labs  -imaging  -ua  -pain management  -reassess  -dispo pending

## 2022-02-12 NOTE — ED PROVIDER NOTE - NSPTACCESSSVCSAPPTDETAILS_ED_ALL_ED_FT
Follow up for discussion for elective repair of hernia: laparoscopic (Dr. Owens) or open (Dr. Mcpherson)

## 2022-02-12 NOTE — ED PROVIDER NOTE - OBJECTIVE STATEMENT
72 yo female hx of htn, hld, dm, tummy tuck complicated with umbilical hernia presenting with moderate L sided abd pain for the past few days, worsening today associated with 1 episode of vomiting and rash over umbilicus. Last BM last night. denies fever, chest pain, sob, cough, urinary symptoms, hematuria, hematochezia. 72 yo female hx of htn, hld, dm, tummy tuck complicated with umbilical hernia 20 years prior presenting with moderate L sided abd pain for the past few days, worsening today associated with 1 episode of vomiting and rash over umbilicus. Last BM last night. denies fever, chest pain, sob, cough, urinary symptoms, hematuria, hematochezia.

## 2022-02-12 NOTE — ED PROVIDER NOTE - CLINICAL SUMMARY MEDICAL DECISION MAKING FREE TEXT BOX
WBC 13, lactate 2.2.  UA negative CT abdomen with large fat-containing ventral hernia, mild fat stranding at the neck.  Patient seen and evaluated by surgery, no acute intervention at this time to follow-up with surgery as outpatient.  Patient told to return to ER for worsening pain, fever, vomiting, or any other new/concerning symptoms.  Patient given copy of labs and CT report.  Patient understands and agrees with plan.

## 2022-02-12 NOTE — ED PROVIDER NOTE - PATIENT PORTAL LINK FT
You can access the FollowMyHealth Patient Portal offered by [facility] by registering at the following website: [email or web address]. By joining [employer]’s FollowMyHealth portal, you will also be able to view your health information using other applications (apps) compatible with our system.

## 2022-02-12 NOTE — ED PROVIDER NOTE - NSFOLLOWUPINSTRUCTIONS_ED_ALL_ED_FT
Follow up for discussion for elective repair of hernia: laparoscopic (Dr. Owens) or open (Dr. Mcpherson)    Abdominal Pain    You were seen and evaluated for abdominal pain. After being worked up in the Emergency Department, it has been determined that it is safe for you to be discharged with proper medication and follow up. Please take medications as prescribed and bring all of your results to your follow up. As discussed, you may require further work up and testing for your symptoms.    WHAT YOU NEED TO KNOW:  Abdominal pain can be dull, achy, or sharp. You may have pain in one area of your abdomen, or in your entire abdomen. Your pain may be caused by a condition such as constipation, food sensitivity or poisoning, infection, or a blockage. You were screened for any seirous causes of pain here in the Emergency Department. You may require further work up by a stomach doctor specialist (Gastroenterologist). Abdominal pain can also be from a hernia, appendicitis, or an ulcer. Liver, gallbladder, or kidney conditions can also cause abdominal pain. The cause of your abdominal pain may not be known.        DISCHARGE INSTRUCTIONS:  Call your local emergency number (911 in the ) if:  You have new chest pain or shortness of breath.  Return to the emergency department if:  You have pulsing pain in your upper abdomen or lower back that suddenly becomes constant.  Your pain is in the right lower abdominal area and worsens with movement.  You have a fever over 100.4°F (38°C) or shaking chills.  You are vomiting and cannot keep food or liquids down.  Your pain does not improve or gets worse over the next 8 to 12 hours.  You see blood in your vomit or bowel movements, or they look black and tarry.  Your skin or the whites of your eyes turn yellow.  You are a woman and have a large amount of vaginal bleeding that is not your monthly period.  Call your doctor if:  You have pain in your lower back.  You are a man and have pain in your testicles.  You have pain when you urinate.  You have questions or concerns about your condition or care.  Medicines:  Prescription pain medicine may be given. Ask your healthcare provider how to take this medicine safely. Some prescription pain medicines contain acetaminophen. Do not take other medicines that contain acetaminophen without talking to your healthcare provider. Too much acetaminophen may cause liver damage. Prescription pain medicine may cause constipation. Ask your healthcare provider how to prevent or treat constipation.  Medicines may be given to calm your stomach or prevent vomiting.  Take your medicine as directed. Contact your healthcare provider if you think your medicine is not helping or if you have side effects. Tell him of her if you are allergic to any medicine. Keep a list of the medicines, vitamins, and herbs you take. Include the amounts, and when and why you take them. Bring the list or the pill bottles to follow-up visits. Carry your medicine list with you in case of an emergency.

## 2022-02-12 NOTE — ED ADULT NURSE REASSESSMENT NOTE - NS ED NURSE REASSESS COMMENT FT1
Assumed care from previous nurse c/o abdominal pain and rash over umbilicus , HX umbilical hernia , tummy tuck , AO x 4 , no vomiting noted , no SOB , IVL intact , no grimaced face noted , noted redness/ rash over umbilicus , will continue to monitor

## 2022-02-12 NOTE — ED PROVIDER NOTE - IV ALTEPLASE DOOR HIDDEN
show
Detail Level: Generalized
Quality 110: Preventive Care And Screening: Influenza Immunization: Influenza Immunization Administered during Influenza season
Quality 130: Documentation Of Current Medications In The Medical Record: Current Medications Documented

## 2022-02-12 NOTE — ED PROVIDER NOTE - NS ED ROS FT
Constitutional:  see HPI  Head:  no headache, dizziness, loss of consciousness  Eyes:  no visual changes; no eye pain, redness, or discharge  ENMT:  no ear pain or discharge; no hearing problems; no mouth or throat sores or lesions; no throat pain  Cardiac: no chest pain, tachycardia or palpitations  Respiratory: no cough, wheezing, shortness of breath, chest tightness, or trouble breathing  GI: abd pain, nausea, vomiting  :  no dysuria, frequency, or burning with urination; no change in urine output  MS: no myalgias, muscle weakness, joint pain,or  injury; no joint swelling  Neuro: no weakness; no numbness or tingling; no seizure  Skin:  rash

## 2022-02-12 NOTE — ED PROVIDER NOTE - CARE PROVIDERS DIRECT ADDRESSES
[email or web address]

## 2022-02-13 PROBLEM — K21.9 GASTRO-ESOPHAGEAL REFLUX DISEASE WITHOUT ESOPHAGITIS: Chronic | Status: ACTIVE | Noted: 2018-09-16

## 2022-02-13 PROBLEM — E78.00 PURE HYPERCHOLESTEROLEMIA, UNSPECIFIED: Chronic | Status: ACTIVE | Noted: 2018-09-16

## 2022-02-13 PROBLEM — I10 ESSENTIAL (PRIMARY) HYPERTENSION: Chronic | Status: ACTIVE | Noted: 2018-09-16

## 2022-02-13 PROBLEM — M06.9 RHEUMATOID ARTHRITIS, UNSPECIFIED: Chronic | Status: ACTIVE | Noted: 2018-09-16

## 2022-02-13 PROBLEM — R73.03 PREDIABETES: Chronic | Status: ACTIVE | Noted: 2018-09-16

## 2022-02-13 PROCEDURE — 99285 EMERGENCY DEPT VISIT HI MDM: CPT

## 2022-02-13 NOTE — CONSULT NOTE ADULT - SUBJECTIVE AND OBJECTIVE BOX
GENERAL SURGERY CONSULT NOTE    Patient: HEBERT URIBE , 71y (52)Female   MRN: [id number]  Location: [facility]  Visit: 22 Emergency  Date: 22 @ 00:52    HPI:  71F w/ PMH/PSH of abdominoplasty (20 years ago, in [place]) c/b wound dehiscence requiring multiple RTOR, b/l breast reduction who presented to the ED with left-sided abdominal pain. Pt reports pain started a few days ago; located in her LUQ radiating down her L flank, and around to her back. She reports one episode of emesis, but said that it may have been anxiety related. Denies obstipation or constipation; having normal bowel function. She reports a long history of this ventral wall hernia (ever since her surgeries) that she has had frequent discomfort/pain for, but never considered repair d/t her prior experiences. Over the past 24-48 hours, she noted a small area of erythema in the superior aspect of her umbilicus. She went to see her PCP, who recommended presentation to the ED.     In the ED, WBC 13, lactate 2.2. CT A/P performed with findings of a large fat-containing lower abdominal wall midline ventral hernia measuring approximately 12 x 7.5 x 15 cm and 3 cm at the neck with very mild fat stranding at the neck. Herniated fat is otherwise without significant reactive changes. It also showed left lower abdominal subcutaneous calcified granulomas. Surgery consulted for possible recommendations.    PAST MEDICAL & SURGICAL HISTORY:  Rheumatoid arthritis  High cholesterol  Borderline diabetes  HTN (hypertension)  GERD (gastroesophageal reflux disease)  Status post breast reduction  H/O abdominoplasty    Home Medications:  Advil 200 mg oral tablet: 1 tab(s) orally every 6 hours, As Needed (2022 11:38)  Dulera 100 mcg-5 mcg/inh inhalation aerosol: 2 puff(s) inhaled 2 times a day (2022 11:38)  Xanax 1 mg oral tablet: 1 tab(s) orally once a day, As Needed (2022 11:38)  Zantac 150 oral tablet: 1 tab(s) orally once a day (2022 11:38)    VITALS:  T(F): 98.1 (22 @ 17:46), Max: 98.1 (22 @ 17:46)  HR: 92 (22 @ 17:46) (92 - 92)  BP: 172/81 (22 @ 17:46) (172/81 - 172/81)  RR: 18 (22 @ 17:46) (18 - 18)  SpO2: 95% (22 @ 17:46) (95% - 95%)    PHYSICAL EXAM:  General: NAD, AAOx3, calm and cooperative  HEENT: NCAT, ALE, EOMI, Trachea ML, Neck supple  Cardiac: RRR S1, S2, no Murmurs, rubs or gallops  Respiratory: normal respiratory effort  Abdomen: Soft, non-distended, very mildly tender in LLQ, no rebound, no guarding; ventral hernia easily reducible, soft, small area of skin excoriation in superior aspect of umbilicus, non-tender, no signs of strangulation or incarceration  Skin: Warm/dry, normal color, no jaundice    LAB/STUDIES:                        12.4   13.47 )-----------( 305      ( 2022 18:13 )             38.1     -    136  |  102  |  13  ----------------------------<  172<H>  5.7<H>   |  21  |  0.8    Ca    8.9      2022 18:13    TPro  7.3  /  Alb  4.0  /  TBili  0.4  /  DBili  x   /  AST  42<H>  /  ALT  19  /  AlkPhos  79  12    LIVER FUNCTIONS - ( 2022 18:13 )  Alb: 4.0 g/dL / Pro: 7.3 g/dL / ALK PHOS: 79 U/L / ALT: 19 U/L / AST: 42 U/L / GGT: x           Urinalysis Basic - ( 2022 20:48 )    Color: Light Yellow / Appearance: Clear / S.006 / pH: x  Gluc: x / Ketone: Negative  / Bili: Negative / Urobili: <2 mg/dL   Blood: x / Protein: Negative / Nitrite: Negative   Leuk Esterase: Negative / RBC: x / WBC x   Sq Epi: x / Non Sq Epi: x / Bacteria: x    IMAGING:  < from: CT Abdomen and Pelvis w/ Oral Cont and w/ IV Cont (22 @ 21:31) >  BONES/SOFT TISSUES: No acute osseous abnormality. Grade 1 anterolisthesis   of L4 on L5. Bony degenerative changes.    Large fat-containing lower abdominal midline ventral hernia measuring   approximately 12 x 7.5 x 15 cm and 3 cm at the neck. Mild fat stranding   at the neck. Herniated fat is otherwise without significant reactive   changes.    Large bilateral fat-containing inguinal hernias.  Left lower abdominal subcutaneous calcified granulomas.    IMPRESSION:  Large fat-containing lower abdominal wall midline ventral hernia measuring approximately 12 x 7.5 x 15 cm and 3 cm at the neck. Mild fat stranding at the neck. Herniated fat is otherwise without significant reactive changes.  No evidence of an acute intra-abdominal abnormality.  Cholelithiasis and sludge.  < end of copied text >    ACCESS DEVICES:  [X] Peripheral IV  [ ] Central Venous Line	[ ] R	[ ] L	[ ] IJ	[ ] Fem	[ ] SC	Placed:   [ ] Arterial Line		[ ] R	[ ] L	[ ] Fem	[ ] Rad	[ ] Ax	Placed:   [ ] PICC:					[ ] Mediport  [ ] Urinary Catheter, Date Placed:

## 2022-02-13 NOTE — CONSULT NOTE ADULT - ASSESSMENT
ASSESSMENT:  71F w/ PMH/PSH of abdominoplasty (20 years ago, in [place]) c/b wound dehiscence requiring multiple RTOR, b/l breast reduction who presented to the ED with left-sided abdominal pain. Physical exam findings, imaging, and labs as documented above.     PLAN:  - no acute surgical intervention  - very low suspicion for incarcerated/strangulated ventral hernia, especially given wide neck on CT and clinical exam  - L-sided abdominal pain may be 2/2 subcutaneous calcified granulomas -- possibly suture granulomas from prior surgeries  - would not recommend intervention unless continues to persistently cause problems  - recommend outpatient FU for discussion for elective repair of hernia: laparoscopic (Dr. Owens) or open (Dr. Mcpherson)  - area of erythema over umbilicus looks like skin excoriation, possibly 2/2 patient's body habitus  - avoid lifting heavy objects, engaging in activities that increase abdominal pressure/worsen hernia    Lines/Tubes: PIV    Above plan discussed with Attending Surgeon Dr. Abrams, patient, and Primary team  02-13-22 @ 00:52

## 2022-02-13 NOTE — ED ADULT NURSE REASSESSMENT NOTE - NS ED NURSE REASSESS COMMENT FT1
report from previous shift rn yue states that patient is discharged waiting for spouse to .  patient is not in assigned area. assumed left ED

## 2022-02-14 LAB
CULTURE RESULTS: SIGNIFICANT CHANGE UP
SPECIMEN SOURCE: SIGNIFICANT CHANGE UP

## 2023-06-27 ENCOUNTER — APPOINTMENT (OUTPATIENT)
Dept: ORTHOPEDIC SURGERY | Facility: CLINIC | Age: 73
End: 2023-06-27

## 2024-05-28 NOTE — HISTORY OF PRESENT ILLNESS
[de-identified] : HEBERT is a 73 year old female is being referred by Dr Lane for initial evaluation of leukocytosis. She has DM and hyperlipidemia.   CBC reviewed: 6/8/23 WBC 11.5 H/H 14.2/42.5 Platelets 359 Neutrophils 7498 Lymphocytes 3209 % neutrophils 65.2 % lymphocytes 27.9  4/25/24 WBC 13.5 H/H 14.6/43.8 Platelet 368 Neutrophils 8546 Lymphocytes 4010 % neutrophils 63.3 % lymphocytes 29.7

## 2024-05-30 ENCOUNTER — APPOINTMENT (OUTPATIENT)
Age: 74
End: 2024-05-30

## 2025-01-19 ENCOUNTER — EMERGENCY (EMERGENCY)
Facility: HOSPITAL | Age: 75
LOS: 0 days | Discharge: HOME | End: 2025-01-19
Attending: STUDENT IN AN ORGANIZED HEALTH CARE EDUCATION/TRAINING PROGRAM
Payer: MEDICARE

## 2025-01-19 VITALS
OXYGEN SATURATION: 98 % | DIASTOLIC BLOOD PRESSURE: 146 MMHG | TEMPERATURE: 98 F | SYSTOLIC BLOOD PRESSURE: 167 MMHG | HEART RATE: 101 BPM | WEIGHT: 259.93 LBS | HEIGHT: 67 IN | RESPIRATION RATE: 16 BRPM
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
## 2025-01-19 DIAGNOSIS — M17.12 UNILATERAL PRIMARY OSTEOARTHRITIS, LEFT KNEE: ICD-10-CM

## 2025-01-19 DIAGNOSIS — S00.81XA ABRASION OF OTHER PART OF HEAD, INITIAL ENCOUNTER: ICD-10-CM

## 2025-01-19 DIAGNOSIS — Z98.890 OTHER SPECIFIED POSTPROCEDURAL STATES: Chronic | ICD-10-CM
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
## 2025-01-19 DIAGNOSIS — R07.82 INTERCOSTAL PAIN: ICD-10-CM

## 2025-01-19 DIAGNOSIS — Z88.1 ALLERGY STATUS TO OTHER ANTIBIOTIC AGENTS: ICD-10-CM

## 2025-01-19 DIAGNOSIS — I10 ESSENTIAL (PRIMARY) HYPERTENSION: ICD-10-CM

## 2025-01-19 DIAGNOSIS — E11.9 TYPE 2 DIABETES MELLITUS WITHOUT COMPLICATIONS: ICD-10-CM

## 2025-01-19 DIAGNOSIS — Z23 ENCOUNTER FOR IMMUNIZATION: ICD-10-CM

## 2025-01-19 DIAGNOSIS — R51.9 HEADACHE, UNSPECIFIED: ICD-10-CM

## 2025-01-19 DIAGNOSIS — E78.5 HYPERLIPIDEMIA, UNSPECIFIED: ICD-10-CM

## 2025-01-19 DIAGNOSIS — Z88.0 ALLERGY STATUS TO PENICILLIN: ICD-10-CM

## 2025-01-19 DIAGNOSIS — M54.50 LOW BACK PAIN, UNSPECIFIED: ICD-10-CM

## 2025-01-19 DIAGNOSIS — S80.212A ABRASION, LEFT KNEE, INITIAL ENCOUNTER: ICD-10-CM

## 2025-01-19 DIAGNOSIS — S80.211A ABRASION, RIGHT KNEE, INITIAL ENCOUNTER: ICD-10-CM

## 2025-01-19 LAB
ALBUMIN SERPL ELPH-MCNC: 4.2 G/DL — SIGNIFICANT CHANGE UP (ref 3.5–5.2)
ALP SERPL-CCNC: 92 U/L — SIGNIFICANT CHANGE UP (ref 30–115)
ALT FLD-CCNC: 17 U/L — SIGNIFICANT CHANGE UP (ref 0–41)
ANION GAP SERPL CALC-SCNC: 11 MMOL/L — SIGNIFICANT CHANGE UP (ref 7–14)
AST SERPL-CCNC: 24 U/L — SIGNIFICANT CHANGE UP (ref 0–41)
BASOPHILS # BLD AUTO: 0.02 K/UL — SIGNIFICANT CHANGE UP (ref 0–0.2)
BASOPHILS NFR BLD AUTO: 0.1 % — SIGNIFICANT CHANGE UP (ref 0–1)
BILIRUB SERPL-MCNC: 0.6 MG/DL — SIGNIFICANT CHANGE UP (ref 0.2–1.2)
BUN SERPL-MCNC: 10 MG/DL — SIGNIFICANT CHANGE UP (ref 10–20)
CALCIUM SERPL-MCNC: 9.3 MG/DL — SIGNIFICANT CHANGE UP (ref 8.4–10.4)
CHLORIDE SERPL-SCNC: 103 MMOL/L — SIGNIFICANT CHANGE UP (ref 98–110)
CO2 SERPL-SCNC: 28 MMOL/L — SIGNIFICANT CHANGE UP (ref 17–32)
CREAT SERPL-MCNC: 0.7 MG/DL — SIGNIFICANT CHANGE UP (ref 0.7–1.5)
EGFR: 91 ML/MIN/1.73M2 — SIGNIFICANT CHANGE UP
EOSINOPHIL # BLD AUTO: 0.01 K/UL — SIGNIFICANT CHANGE UP (ref 0–0.7)
EOSINOPHIL NFR BLD AUTO: 0.1 % — SIGNIFICANT CHANGE UP (ref 0–8)
GLUCOSE SERPL-MCNC: 140 MG/DL — HIGH (ref 70–99)
HCT VFR BLD CALC: 41.6 % — SIGNIFICANT CHANGE UP (ref 37–47)
HGB BLD-MCNC: 13.6 G/DL — SIGNIFICANT CHANGE UP (ref 12–16)
IMM GRANULOCYTES NFR BLD AUTO: 0.5 % — HIGH (ref 0.1–0.3)
LYMPHOCYTES # BLD AUTO: 19.1 % — LOW (ref 20.5–51.1)
LYMPHOCYTES # BLD AUTO: 2.66 K/UL — SIGNIFICANT CHANGE UP (ref 1.2–3.4)
MCHC RBC-ENTMCNC: 30.3 PG — SIGNIFICANT CHANGE UP (ref 27–31)
MCHC RBC-ENTMCNC: 32.7 G/DL — SIGNIFICANT CHANGE UP (ref 32–37)
MCV RBC AUTO: 92.7 FL — SIGNIFICANT CHANGE UP (ref 81–99)
MONOCYTES # BLD AUTO: 0.98 K/UL — HIGH (ref 0.1–0.6)
MONOCYTES NFR BLD AUTO: 7 % — SIGNIFICANT CHANGE UP (ref 1.7–9.3)
NEUTROPHILS # BLD AUTO: 10.18 K/UL — HIGH (ref 1.4–6.5)
NEUTROPHILS NFR BLD AUTO: 73.2 % — SIGNIFICANT CHANGE UP (ref 42.2–75.2)
NRBC # BLD: 0 /100 WBCS — SIGNIFICANT CHANGE UP (ref 0–0)
PLATELET # BLD AUTO: 280 K/UL — SIGNIFICANT CHANGE UP (ref 130–400)
PMV BLD: 10.8 FL — HIGH (ref 7.4–10.4)
POTASSIUM SERPL-MCNC: 4.8 MMOL/L — SIGNIFICANT CHANGE UP (ref 3.5–5)
POTASSIUM SERPL-SCNC: 4.8 MMOL/L — SIGNIFICANT CHANGE UP (ref 3.5–5)
PROT SERPL-MCNC: 7.2 G/DL — SIGNIFICANT CHANGE UP (ref 6–8)
RBC # BLD: 4.49 M/UL — SIGNIFICANT CHANGE UP (ref 4.2–5.4)
RBC # FLD: 14 % — SIGNIFICANT CHANGE UP (ref 11.5–14.5)
SODIUM SERPL-SCNC: 142 MMOL/L — SIGNIFICANT CHANGE UP (ref 135–146)
WBC # BLD: 13.92 K/UL — HIGH (ref 4.8–10.8)
WBC # FLD AUTO: 13.92 K/UL — HIGH (ref 4.8–10.8)

## 2025-01-19 PROCEDURE — 71260 CT THORAX DX C+: CPT | Mod: MC

## 2025-01-19 PROCEDURE — 72125 CT NECK SPINE W/O DYE: CPT | Mod: MC

## 2025-01-19 PROCEDURE — 73564 X-RAY EXAM KNEE 4 OR MORE: CPT | Mod: 50

## 2025-01-19 PROCEDURE — 70486 CT MAXILLOFACIAL W/O DYE: CPT | Mod: MC

## 2025-01-19 PROCEDURE — 85025 COMPLETE CBC W/AUTO DIFF WBC: CPT

## 2025-01-19 PROCEDURE — 70486 CT MAXILLOFACIAL W/O DYE: CPT | Mod: 26

## 2025-01-19 PROCEDURE — 36415 COLL VENOUS BLD VENIPUNCTURE: CPT

## 2025-01-19 PROCEDURE — 99285 EMERGENCY DEPT VISIT HI MDM: CPT

## 2025-01-19 PROCEDURE — 90715 TDAP VACCINE 7 YRS/> IM: CPT

## 2025-01-19 PROCEDURE — 74177 CT ABD & PELVIS W/CONTRAST: CPT | Mod: MC

## 2025-01-19 PROCEDURE — 80053 COMPREHEN METABOLIC PANEL: CPT

## 2025-01-19 PROCEDURE — 72125 CT NECK SPINE W/O DYE: CPT | Mod: 26

## 2025-01-19 PROCEDURE — 99284 EMERGENCY DEPT VISIT MOD MDM: CPT | Mod: 25

## 2025-01-19 PROCEDURE — 73564 X-RAY EXAM KNEE 4 OR MORE: CPT | Mod: 26,50

## 2025-01-19 PROCEDURE — 90471 IMMUNIZATION ADMIN: CPT

## 2025-01-19 PROCEDURE — 70450 CT HEAD/BRAIN W/O DYE: CPT | Mod: 26

## 2025-01-19 PROCEDURE — 36000 PLACE NEEDLE IN VEIN: CPT | Mod: XU

## 2025-01-19 PROCEDURE — 70450 CT HEAD/BRAIN W/O DYE: CPT | Mod: MC

## 2025-01-19 PROCEDURE — 71260 CT THORAX DX C+: CPT | Mod: 26

## 2025-01-19 PROCEDURE — 74177 CT ABD & PELVIS W/CONTRAST: CPT | Mod: 26

## 2025-01-19 RX ORDER — CLOSTRIDIUM TETANI TOXOID ANTIGEN (FORMALDEHYDE INACTIVATED), CORYNEBACTERIUM DIPHTHERIAE TOXOID ANTIGEN (FORMALDEHYDE INACTIVATED), BORDETELLA PERTUSSIS TOXOID ANTIGEN (GLUTARALDEHYDE INACTIVATED), BORDETELLA PERTUSSIS FILAMENTOUS HEMAGGLUTININ ANTIGEN (FORMALDEHYDE INACTIVATED), BORDETELLA PERTUSSIS PERTACTIN ANTIGEN, AND BORDETELLA PERTUSSIS FIMBRIAE 2/3 ANTIGEN 5; 2; 2.5; 5; 3; 5 [LF]/.5ML; [LF]/.5ML; UG/.5ML; UG/.5ML; UG/.5ML; UG/.5ML
0.5 INJECTION, SUSPENSION INTRAMUSCULAR ONCE
Refills: 0 | Status: COMPLETED | OUTPATIENT
Start: 2025-01-19 | End: 2025-01-19

## 2025-01-19 RX ORDER — ACETAMINOPHEN 80 MG/.8ML
975 SOLUTION/ DROPS ORAL ONCE
Refills: 0 | Status: COMPLETED | OUTPATIENT
Start: 2025-01-19 | End: 2025-01-19

## 2025-01-19 RX ORDER — CLOSTRIDIUM TETANI TOXOID ANTIGEN (FORMALDEHYDE INACTIVATED), CORYNEBACTERIUM DIPHTHERIAE TOXOID ANTIGEN (FORMALDEHYDE INACTIVATED), BORDETELLA PERTUSSIS TOXOID ANTIGEN (GLUTARALDEHYDE INACTIVATED), BORDETELLA PERTUSSIS FILAMENTOUS HEMAGGLUTININ ANTIGEN (FORMALDEHYDE INACTIVATED), BORDETELLA PERTUSSIS PERTACTIN ANTIGEN, AND BORDETELLA PERTUSSIS FIMBRIAE 2/3 ANTIGEN 5; 2; 2.5; 5; 3; 5 [LF]/.5ML; [LF]/.5ML; UG/.5ML; UG/.5ML; UG/.5ML; UG/.5ML
0.5 INJECTION, SUSPENSION INTRAMUSCULAR ONCE
Refills: 0 | Status: DISCONTINUED | OUTPATIENT
Start: 2025-01-19 | End: 2025-01-19

## 2025-01-19 RX ADMIN — CLOSTRIDIUM TETANI TOXOID ANTIGEN (FORMALDEHYDE INACTIVATED), CORYNEBACTERIUM DIPHTHERIAE TOXOID ANTIGEN (FORMALDEHYDE INACTIVATED), BORDETELLA PERTUSSIS TOXOID ANTIGEN (GLUTARALDEHYDE INACTIVATED), BORDETELLA PERTUSSIS FILAMENTOUS HEMAGGLUTININ ANTIGEN (FORMALDEHYDE INACTIVATED), BORDETELLA PERTUSSIS PERTACTIN ANTIGEN, AND BORDETELLA PERTUSSIS FIMBRIAE 2/3 ANTIGEN 0.5 MILLILITER(S): 5; 2; 2.5; 5; 3; 5 INJECTION, SUSPENSION INTRAMUSCULAR at 17:44

## 2025-01-19 RX ADMIN — ACETAMINOPHEN 975 MILLIGRAM(S): 80 SOLUTION/ DROPS ORAL at 17:44

## 2025-01-19 NOTE — ED PROVIDER NOTE - DIFFERENTIAL DIAGNOSIS
Differential Diagnosis The differential diagnosis for patients clinical presentation includes but is not limited to: fracture, sprain, strain, contusion, abrasion

## 2025-01-19 NOTE — ED PROVIDER NOTE - OBJECTIVE STATEMENT
Patient is a 74-year-old female PMH hypertension, hyperlipidemia, diabetes, RA coming to ED for fall.  Patient reports walking up cement stairs outside, left knee (with bad arthritis) gave out from under her, patient fell forward both knees hitting 1 step, hit another step with her head/face.  Able to get up with assistance of daughter, ambulatory after.  Reports mild headache, abrasion to forehead and under her right eye/cheekbone, abrasion to bilateral knees.  Denies LOC, AC use, neck pain, chest pain, abdominal pain, difficulty breathing, other extremity injury/pain, difficulty walking, unilateral numbness/weakness, urinary/fecal incontinence

## 2025-01-19 NOTE — ED PROVIDER NOTE - CARE PROVIDERS DIRECT ADDRESSES
,[email or web address]

## 2025-01-19 NOTE — ED PROVIDER NOTE - CLINICAL SUMMARY MEDICAL DECISION MAKING FREE TEXT BOX
73 yo F presented to ED for eval of fall. Labs were ordered and reviewed.  Imaging was ordered and reviewed by me.  No signs of acute intra-abdominal or thoracic traumatic pathology. Appropriate medications for patient's presenting complaints were ordered and effects were reassessed.  Patient's records (prior hospital, ED visit, and/or nursing home notes if available) were reviewed.  Additional history was obtained from EMS, family, and/or PCP (where available).  Escalation to admission/observation was considered.  However patient feels much better and is comfortable with discharge.  Appropriate follow-up was arranged. Return precautions discussed in detail.

## 2025-01-19 NOTE — ED PROVIDER NOTE - PATIENT PORTAL LINK FT
You can access the FollowMyHealth Patient Portal offered by [facility] by registering at the following website: [email or web address]. By joining [employer]’s FollowMyHealth portal, you will also be able to view your health information using other applications (apps) compatible with our system.

## 2025-01-19 NOTE — ED PROVIDER NOTE - PHYSICAL EXAMINATION
CONST: Well appearing in NAD  EYES: EOMI, Sclera and conjunctiva clear.   ENT: No nasal discharge. Oropharynx normal appearing, no erythema or exudates. Uvula midline.  NECK: Non-tender  CARD: Normal S1 S2; Normal rate and rhythm  RESP: Equal BS B/L, No wheezes, rhonchi or rales. No distress  GI: Soft, non-tender, non-distended.  MS: Normal ROM in all extremities. No midline spinal tenderness.  SKIN: Warm, dry, Good turgor; mild superficial abrasion to forehead and R inferior orbital area; b/l knee skin abrasions  NEURO: A&Ox3, No focal deficits. Strength 5/5 with no sensory deficits. Steady gait

## 2025-01-19 NOTE — ED PROVIDER NOTE - CARE PLAN
Assessment and plan of treatment:	Plan- ct xr reassess   Principal Discharge DX:	Fall  Assessment and plan of treatment:	Plan- ct xr reassess   1

## 2025-01-19 NOTE — ED ADULT NURSE NOTE - OBJECTIVE STATEMENT
Patient presents to ED s/p fall downstairs at. -HT, -LOC. Pt states she was walking outside and fell down cement stairs at home. Pt endorses to hitting head and falling onto b/l knees causing abrasions.

## 2025-01-19 NOTE — ED PROVIDER NOTE - ATTENDING APP SHARED VISIT CONTRIBUTION OF CARE
74year-old female past medical history hypertension, hyperlipidemia, diabetes, RA presents to the emergency department status post mechanical fall while walking up the steps.  Patient reports tripping over a step, her left knee with arthritis gave out, fell onto both knees, hitting her head.  No LOC.  Patient reporting mild headache and burning his bilateral knees.  No AC.  No LOC.  Denies chest pain, shortness breath, nausea, vomiting, abdominal pain, numbness, weakness.    CONSTITUTIONAL: NAD.   SKIN: warm, dry. abrasion to forehead and R inferior orbital area   HEAD: Normocephalic; atraumatic.  EENT: MMM. PERRLA, EOMI   NECK: Supple.  CARD: RRR.   RESP: No wheezes, rales or rhonchi.  ABD: soft ntnd.   EXT: Normal ROM.  No lower extremity edema.   BACK: no midline tenderness or stepoffs ; b/l lumbar paraspinal muscle tenderness   NEURO: AAOx3, CN 2-12 grossly intact. +5/5 strength and sensation wnl in all extremities. 74year-old female past medical history hypertension, hyperlipidemia, diabetes, RA presents to the emergency department status post mechanical fall while walking up the steps.  Patient reports tripping over a step, her left knee with arthritis gave out, fell onto both knees, hitting her head.  No LOC.  Patient reporting mild headache and burning his bilateral knees.  No AC.  No LOC.  Denies chest pain, shortness breath, nausea, vomiting, abdominal pain, numbness, weakness.    CONSTITUTIONAL: NAD.   SKIN: warm, dry. abrasion to forehead and R inferior orbital area   HEAD: Normocephalic; atraumatic.  EENT: MMM. PERRLA, EOMI   NECK: Supple.  CARD: RRR.   RESP: No wheezes, rales or rhonchi.  ABD: soft ntnd.   EXT: Normal ROM.  No lower extremity edema. superficial abrasions to b/l knees. distal pulses intact b/l.   BACK: no midline tenderness or stepoffs ; b/l lumbar paraspinal muscle tenderness   NEURO: AAOx3, CN 2-12 grossly intact. +5/5 strength and sensation wnl in all extremities.

## 2025-01-19 NOTE — ED ADULT TRIAGE NOTE - CHIEF COMPLAINT QUOTE
mechanical fall while walking up steps  hit head on cement & abrasions to b/l knees  denies LOC, denies AC

## 2025-01-19 NOTE — ED PROVIDER NOTE - NSFOLLOWUPINSTRUCTIONS_ED_ALL_ED_FT
Fall Prevention in the Home, Adult  Falls can cause injuries and can affect people from all age groups. There are many simple things that you can do to make your home safe and to help prevent falls. Ask for help when making these changes, if needed.    What actions can I take to prevent falls?  General instructions     Use good lighting in all rooms. Replace any light bulbs that burn out.  Turn on lights if it is dark. Use night-lights.  Place frequently used items in easy-to-reach places. Lower the shelves around your home if necessary.  Set up furniture so that there are clear paths around it. Avoid moving your furniture around.  Remove throw rugs and other tripping hazards from the floor.  Avoid walking on wet floors.  Fix any uneven floor surfaces.  Add color or contrast paint or tape to grab bars and handrails in your home. Place contrasting color strips on the first and last steps of stairways.  When you use a stepladder, make sure that it is completely opened and that the sides are firmly locked. Have someone hold the ladder while you are using it. Do not climb a closed stepladder.  Be aware of any and all pets.  What can I do in the bathroom?     Keep the floor dry. Immediately clean up any water that spills onto the floor.  Remove soap buildup in the tub or shower on a regular basis.  Use non-skid mats or decals on the floor of the tub or shower.  Attach bath mats securely with double-sided, non-slip rug tape.  If you need to sit down while you are in the shower, use a plastic, non-slip stool.  Image ImageInstall grab bars by the toilet and in the tub and shower. Do not use towel bars as grab bars.  What can I do in the bedroom?     Make sure that a bedside light is easy to reach.  Do not use oversized bedding that drapes onto the floor.  Have a firm chair that has side arms to use for getting dressed.  What can I do in the kitchen?     Clean up any spills right away.  If you need to reach for something above you, use a sturdy step stool that has a grab bar.  Keep electrical cables out of the way.  Do not use floor polish or wax that makes floors slippery. If you must use wax, make sure that it is non-skid floor wax.  What can I do in the stairways?     Do not leave any items on the stairs.  Make sure that you have a light switch at the top of the stairs and the bottom of the stairs. Have them installed if you do not have them.  Make sure that there are handrails on both sides of the stairs. Fix handrails that are broken or loose. Make sure that handrails are as long as the stairways.  Install non-slip stair treads on all stairs in your home.  Avoid having throw rugs at the top or bottom of stairways, or secure the rugs with carpet tape to prevent them from moving.  Choose a carpet design that does not hide the edge of steps on the stairway.  Check any carpeting to make sure that it is firmly attached to the stairs. Fix any carpet that is loose or worn.  What can I do on the outside of my home?     Use bright outdoor lighting.  Regularly repair the edges of walkways and driveways and fix any cracks.  Remove high doorway thresholds.  Trim any shrubbery on the main path into your home.  Regularly check that handrails are securely fastened and in good repair. Both sides of any steps should have handrails.  Install guardrails along the edges of any raised decks or porches.  Clear walkways of debris and clutter, including tools and rocks.  Have leaves, snow, and ice cleared regularly.  Use sand or salt on walkways during winter months.  In the garage, clean up any spills right away, including grease or oil spills.  What other actions can I take?     Wear closed-toe shoes that fit well and support your feet. Wear shoes that have rubber soles or low heels.  Use mobility aids as needed, such as canes, walkers, scooters, and crutches.  Review your medicines with your health care provider. Some medicines can cause dizziness or changes in blood pressure, which increase your risk of falling.  Talk with your health care provider about other ways that you can decrease your risk of falls. This may include working with a physical therapist or  to improve your strength, balance, and endurance.    Where to find more information  Centers for Disease Control and Prevention, [place]: https://www.cdc.gov  [employer] [place] [employer] Aging: https://jz5ktqn.anu.nih.gov  Contact a health care provider if:  You are afraid of falling at home.  You feel weak, drowsy, or dizzy at home.  You fall at home.  Summary  There are many simple things that you can do to make your home safe and to help prevent falls.  Ways to make your home safe include removing tripping hazards and installing grab bars in the bathroom.  Ask for help when making these changes in your home.  This information is not intended to replace advice given to you by your health care provider. Make sure you discuss any questions you have with your health care provider.

## 2025-01-19 NOTE — ED ADULT NURSE NOTE - NSFALLRISKINTERV_ED_ALL_ED

## 2025-01-19 NOTE — ED PROVIDER NOTE - PROGRESS NOTE DETAILS
Authored by Chantel Carcamo, DO: Attempted to do bedside e-FAST. Unable to obtain great views. Pt now reports mild b/l anterior rib pains, no SOB, no ecchymoses and also reporting lower back pain, no midline tenderness or stepoffs. Will add on additional CT imaging.

## 2025-01-19 NOTE — ED PROVIDER NOTE - CARE PROVIDER_API CALL
[place] [address]  Phone: [phone]  Fax: [phone]  Follow Up Time: 1-3 Days

## 2025-02-24 ENCOUNTER — APPOINTMENT (OUTPATIENT)
Dept: ORTHOPEDIC SURGERY | Facility: CLINIC | Age: 75
End: 2025-02-24

## 2025-09-08 ENCOUNTER — TRANSCRIPTION ENCOUNTER (OUTPATIENT)
Age: 75
End: 2025-09-08

## 2025-09-09 ENCOUNTER — TRANSCRIPTION ENCOUNTER (OUTPATIENT)
Age: 75
End: 2025-09-09